# Patient Record
Sex: FEMALE | Race: OTHER | HISPANIC OR LATINO | URBAN - METROPOLITAN AREA
[De-identification: names, ages, dates, MRNs, and addresses within clinical notes are randomized per-mention and may not be internally consistent; named-entity substitution may affect disease eponyms.]

---

## 2023-07-21 ENCOUNTER — OUTPATIENT (OUTPATIENT)
Dept: OUTPATIENT SERVICES | Facility: HOSPITAL | Age: 33
LOS: 1 days | End: 2023-07-21
Payer: MEDICAID

## 2023-07-21 DIAGNOSIS — Z01.818 ENCOUNTER FOR OTHER PREPROCEDURAL EXAMINATION: ICD-10-CM

## 2023-07-21 LAB
ANION GAP SERPL CALC-SCNC: 8 MMOL/L — SIGNIFICANT CHANGE UP (ref 5–17)
APTT BLD: 27.2 SEC — LOW (ref 27.5–35.5)
BLD GP AB SCN SERPL QL: SIGNIFICANT CHANGE UP
BUN SERPL-MCNC: 11 MG/DL — SIGNIFICANT CHANGE UP (ref 7–18)
CALCIUM SERPL-MCNC: 8.8 MG/DL — SIGNIFICANT CHANGE UP (ref 8.4–10.5)
CHLORIDE SERPL-SCNC: 109 MMOL/L — HIGH (ref 96–108)
CO2 SERPL-SCNC: 21 MMOL/L — LOW (ref 22–31)
CREAT SERPL-MCNC: 0.44 MG/DL — LOW (ref 0.5–1.3)
EGFR: 133 ML/MIN/1.73M2 — SIGNIFICANT CHANGE UP
GLUCOSE SERPL-MCNC: 88 MG/DL — SIGNIFICANT CHANGE UP (ref 70–99)
HCT VFR BLD CALC: 31 % — LOW (ref 34.5–45)
HGB BLD-MCNC: 9.5 G/DL — LOW (ref 11.5–15.5)
INR BLD: 0.88 RATIO — SIGNIFICANT CHANGE UP (ref 0.88–1.16)
MCHC RBC-ENTMCNC: 22.4 PG — LOW (ref 27–34)
MCHC RBC-ENTMCNC: 30.6 GM/DL — LOW (ref 32–36)
MCV RBC AUTO: 73.1 FL — LOW (ref 80–100)
NRBC # BLD: 0 /100 WBCS — SIGNIFICANT CHANGE UP (ref 0–0)
PLATELET # BLD AUTO: 294 K/UL — SIGNIFICANT CHANGE UP (ref 150–400)
POTASSIUM SERPL-MCNC: 3.7 MMOL/L — SIGNIFICANT CHANGE UP (ref 3.5–5.3)
POTASSIUM SERPL-SCNC: 3.7 MMOL/L — SIGNIFICANT CHANGE UP (ref 3.5–5.3)
PROTHROM AB SERPL-ACNC: 10.4 SEC — LOW (ref 10.5–13.4)
RBC # BLD: 4.24 M/UL — SIGNIFICANT CHANGE UP (ref 3.8–5.2)
RBC # FLD: 15.5 % — HIGH (ref 10.3–14.5)
SODIUM SERPL-SCNC: 138 MMOL/L — SIGNIFICANT CHANGE UP (ref 135–145)
WBC # BLD: 11.27 K/UL — HIGH (ref 3.8–10.5)
WBC # FLD AUTO: 11.27 K/UL — HIGH (ref 3.8–10.5)

## 2023-07-22 LAB — T PALLIDUM AB TITR SER: NEGATIVE — SIGNIFICANT CHANGE UP

## 2023-07-23 ENCOUNTER — TRANSCRIPTION ENCOUNTER (OUTPATIENT)
Age: 33
End: 2023-07-23

## 2023-07-24 ENCOUNTER — INPATIENT (INPATIENT)
Facility: HOSPITAL | Age: 33
LOS: 2 days | Discharge: HOME | End: 2023-07-27
Attending: OBSTETRICS & GYNECOLOGY | Admitting: OBSTETRICS & GYNECOLOGY
Payer: MEDICAID

## 2023-07-24 ENCOUNTER — TRANSCRIPTION ENCOUNTER (OUTPATIENT)
Age: 33
End: 2023-07-24

## 2023-07-24 VITALS — HEIGHT: 63 IN | WEIGHT: 197.98 LBS

## 2023-07-24 DIAGNOSIS — Z3A.39 39 WEEKS GESTATION OF PREGNANCY: ICD-10-CM

## 2023-07-24 LAB — BLD GP AB SCN SERPL QL: SIGNIFICANT CHANGE UP

## 2023-07-24 PROCEDURE — G0463: CPT

## 2023-07-24 DEVICE — SURGICEL FIBRILLAR 1 X 2": Type: IMPLANTABLE DEVICE | Status: FUNCTIONAL

## 2023-07-24 DEVICE — INTERCEED 5 X 6" XL: Type: IMPLANTABLE DEVICE | Status: FUNCTIONAL

## 2023-07-24 DEVICE — SURGICEL FIBRILLAR 2 X 4": Type: IMPLANTABLE DEVICE | Status: FUNCTIONAL

## 2023-07-24 RX ORDER — FAMOTIDINE 10 MG/ML
20 INJECTION INTRAVENOUS ONCE
Refills: 0 | Status: COMPLETED | OUTPATIENT
Start: 2023-07-24 | End: 2023-07-24

## 2023-07-24 RX ORDER — KETOROLAC TROMETHAMINE 30 MG/ML
30 SYRINGE (ML) INJECTION EVERY 6 HOURS
Refills: 0 | Status: DISCONTINUED | OUTPATIENT
Start: 2023-07-24 | End: 2023-07-25

## 2023-07-24 RX ORDER — CEFAZOLIN SODIUM 1 G
2000 VIAL (EA) INJECTION ONCE
Refills: 0 | Status: COMPLETED | OUTPATIENT
Start: 2023-07-24 | End: 2023-07-24

## 2023-07-24 RX ORDER — CITRIC ACID/SODIUM CITRATE 300-500 MG
30 SOLUTION, ORAL ORAL ONCE
Refills: 0 | Status: COMPLETED | OUTPATIENT
Start: 2023-07-24 | End: 2023-07-24

## 2023-07-24 RX ORDER — SODIUM CHLORIDE 9 MG/ML
1000 INJECTION, SOLUTION INTRAVENOUS
Refills: 0 | Status: DISCONTINUED | OUTPATIENT
Start: 2023-07-24 | End: 2023-07-24

## 2023-07-24 RX ORDER — ACETAMINOPHEN 500 MG
975 TABLET ORAL EVERY 6 HOURS
Refills: 0 | Status: DISCONTINUED | OUTPATIENT
Start: 2023-07-25 | End: 2023-07-27

## 2023-07-24 RX ORDER — HEPARIN SODIUM 5000 [USP'U]/ML
5000 INJECTION INTRAVENOUS; SUBCUTANEOUS EVERY 12 HOURS
Refills: 0 | Status: DISCONTINUED | OUTPATIENT
Start: 2023-07-25 | End: 2023-07-27

## 2023-07-24 RX ORDER — SODIUM CHLORIDE 9 MG/ML
1000 INJECTION, SOLUTION INTRAVENOUS
Refills: 0 | Status: DISCONTINUED | OUTPATIENT
Start: 2023-07-24 | End: 2023-07-27

## 2023-07-24 RX ORDER — LANOLIN
1 OINTMENT (GRAM) TOPICAL EVERY 6 HOURS
Refills: 0 | Status: DISCONTINUED | OUTPATIENT
Start: 2023-07-24 | End: 2023-07-27

## 2023-07-24 RX ORDER — ONDANSETRON 8 MG/1
4 TABLET, FILM COATED ORAL EVERY 6 HOURS
Refills: 0 | Status: DISCONTINUED | OUTPATIENT
Start: 2023-07-24 | End: 2023-07-27

## 2023-07-24 RX ORDER — OXYTOCIN 10 UNIT/ML
333.33 VIAL (ML) INJECTION
Qty: 20 | Refills: 0 | Status: DISCONTINUED | OUTPATIENT
Start: 2023-07-24 | End: 2023-07-27

## 2023-07-24 RX ORDER — NALOXONE HYDROCHLORIDE 4 MG/.1ML
0.1 SPRAY NASAL
Refills: 0 | Status: DISCONTINUED | OUTPATIENT
Start: 2023-07-24 | End: 2023-07-27

## 2023-07-24 RX ORDER — IBUPROFEN 200 MG
600 TABLET ORAL EVERY 6 HOURS
Refills: 0 | Status: DISCONTINUED | OUTPATIENT
Start: 2023-07-25 | End: 2023-07-27

## 2023-07-24 RX ORDER — DIPHENHYDRAMINE HCL 50 MG
25 CAPSULE ORAL EVERY 6 HOURS
Refills: 0 | Status: DISCONTINUED | OUTPATIENT
Start: 2023-07-24 | End: 2023-07-27

## 2023-07-24 RX ORDER — OXYCODONE HYDROCHLORIDE 5 MG/1
5 TABLET ORAL
Refills: 0 | Status: DISCONTINUED | OUTPATIENT
Start: 2023-07-25 | End: 2023-07-27

## 2023-07-24 RX ORDER — SIMETHICONE 80 MG/1
80 TABLET, CHEWABLE ORAL EVERY 4 HOURS
Refills: 0 | Status: DISCONTINUED | OUTPATIENT
Start: 2023-07-24 | End: 2023-07-27

## 2023-07-24 RX ORDER — AZITHROMYCIN 500 MG/1
500 TABLET, FILM COATED ORAL ONCE
Refills: 0 | Status: COMPLETED | OUTPATIENT
Start: 2023-07-24 | End: 2023-07-24

## 2023-07-24 RX ORDER — SODIUM CHLORIDE 9 MG/ML
1000 INJECTION, SOLUTION INTRAVENOUS ONCE
Refills: 0 | Status: COMPLETED | OUTPATIENT
Start: 2023-07-24 | End: 2023-07-24

## 2023-07-24 RX ORDER — DEXAMETHASONE 0.5 MG/5ML
4 ELIXIR ORAL EVERY 6 HOURS
Refills: 0 | Status: DISCONTINUED | OUTPATIENT
Start: 2023-07-24 | End: 2023-07-27

## 2023-07-24 RX ORDER — ACETAMINOPHEN 500 MG
1000 TABLET ORAL ONCE
Refills: 0 | Status: COMPLETED | OUTPATIENT
Start: 2023-07-24 | End: 2023-07-24

## 2023-07-24 RX ORDER — MAGNESIUM HYDROXIDE 400 MG/1
30 TABLET, CHEWABLE ORAL
Refills: 0 | Status: DISCONTINUED | OUTPATIENT
Start: 2023-07-24 | End: 2023-07-27

## 2023-07-24 RX ORDER — TETANUS TOXOID, REDUCED DIPHTHERIA TOXOID AND ACELLULAR PERTUSSIS VACCINE, ADSORBED 5; 2.5; 8; 8; 2.5 [IU]/.5ML; [IU]/.5ML; UG/.5ML; UG/.5ML; UG/.5ML
0.5 SUSPENSION INTRAMUSCULAR ONCE
Refills: 0 | Status: DISCONTINUED | OUTPATIENT
Start: 2023-07-24 | End: 2023-07-27

## 2023-07-24 RX ORDER — FOLIC ACID 0.8 MG
1 TABLET ORAL DAILY
Refills: 0 | Status: DISCONTINUED | OUTPATIENT
Start: 2023-07-25 | End: 2023-07-27

## 2023-07-24 RX ORDER — FERROUS SULFATE 325(65) MG
325 TABLET ORAL DAILY
Refills: 0 | Status: DISCONTINUED | OUTPATIENT
Start: 2023-07-25 | End: 2023-07-27

## 2023-07-24 RX ORDER — MORPHINE SULFATE 50 MG/1
0.2 CAPSULE, EXTENDED RELEASE ORAL ONCE
Refills: 0 | Status: DISCONTINUED | OUTPATIENT
Start: 2023-07-24 | End: 2023-07-27

## 2023-07-24 RX ADMIN — Medication 100 MILLIGRAM(S): at 11:02

## 2023-07-24 RX ADMIN — AZITHROMYCIN 255 MILLIGRAM(S): 500 TABLET, FILM COATED ORAL at 11:25

## 2023-07-24 RX ADMIN — FAMOTIDINE 20 MILLIGRAM(S): 10 INJECTION INTRAVENOUS at 10:48

## 2023-07-24 RX ADMIN — Medication 1000 MILLIUNIT(S)/MIN: at 12:01

## 2023-07-24 RX ADMIN — Medication 30 MILLILITER(S): at 10:44

## 2023-07-24 RX ADMIN — SODIUM CHLORIDE 2000 MILLILITER(S): 9 INJECTION, SOLUTION INTRAVENOUS at 10:00

## 2023-07-24 RX ADMIN — Medication 30 MILLIGRAM(S): at 18:19

## 2023-07-24 RX ADMIN — Medication 400 MILLIGRAM(S): at 14:54

## 2023-07-24 RX ADMIN — Medication 1000 MILLIGRAM(S): at 15:15

## 2023-07-24 RX ADMIN — SIMETHICONE 80 MILLIGRAM(S): 80 TABLET, CHEWABLE ORAL at 18:19

## 2023-07-24 NOTE — DISCHARGE NOTE OB - MEDICATION SUMMARY - MEDICATIONS TO TAKE
I will START or STAY ON the medications listed below when I get home from the hospital:    ibuprofen 600 mg oral tablet  -- 1 tab(s) by mouth every 6 hours as needed for  moderate pain  -- Indication: For moderate pain    acetaminophen 325 mg oral tablet  -- 3 tab(s) by mouth every 6 hours as needed for Mild Pain (1 - 3)  -- Indication: For mild pain or fever    Prenatal 1  -- 1 tab(s) by mouth once a day  -- Indication: For Suppleemnt    FeroSul 325 mg (65 mg elemental iron) oral tablet  -- 1 tab(s) by mouth 2 times a day  -- Indication: For Anemia due to acute blood loss    Colace 100 mg oral capsule  -- 1 cap(s) by mouth 2 times a day as needed for  constipation  -- Indication: For Constipation    simethicone 80 mg oral tablet, chewable  -- 1 tab(s) chewed 2 times a day as needed for gas or bloating  -- Indication: For gas or bloating

## 2023-07-24 NOTE — DISCHARGE NOTE OB - PLAN OF CARE
1. Return to your regular way of eating. Resume normal activity as tolerated, however No heavy lifting or strenuous activity for 6 weeks.  No driving for next 2 weeks and/or while on narcotic pain medication.  Complete vaginal rest, no tampons, no douching, no tub bathing, no emerging in swimming pools, no sexual activities for 6 weeks unless otherwise instructed by your doctor. Call your doctor with any signs and symptoms of infection such as fever, chills, nausea or vomiting.  Call your doctor with redness or swelling at the incision site, fluid leakage or wound separation.  Call your doctor if you're unable to tolerate food or have difficulty urinating.  Call your doctor if you have pain that is not relieved by your prescribed medications.  Notify your doctor with any other concerns.  2. Please take Ibuprofen for moderate pain management. 600mg every 6 hours, as needed.  3. Please take Tylenol for mild pain management 500mg every 6 hours, as needed may take 2 tablets , max daily 4000mg only.   4. Ambulate daily. consume iron rich foods.   5. Follow-up in the office in 2 weeks for wound check. take iron, folic acid, vitamin C, and prenatal vitamins. eat iron fortified food

## 2023-07-24 NOTE — DISCHARGE NOTE OB - CARE PROVIDER_API CALL
Nora Bullard  Obstetrics and Gynecology  [address] [facility], [address]  [place] [address]  Phone: [phone]  Fax: [phone]  Established Patient  Follow Up Time: 2 weeks

## 2023-07-24 NOTE — DISCHARGE NOTE OB - HOSPITAL COURSE
Pt had a scheduled repeat  section at 39.3wks. Pt had routine post-op care and was discharged to home.

## 2023-07-24 NOTE — DISCHARGE NOTE OB - PATIENT PORTAL LINK FT
You can access the FollowMyHealth Patient Portal offered by [facility] by registering at the following website: [email or web address]. By joining [employer]’s FollowMyHealth portal, you will also be able to view your health information using other applications (apps) compatible with our system.

## 2023-07-24 NOTE — DISCHARGE NOTE OB - CARE PLAN
1 Principal Discharge DX:	Status post  section  Assessment and plan of treatment:	1. Return to your regular way of eating. Resume normal activity as tolerated, however No heavy lifting or strenuous activity for 6 weeks.  No driving for next 2 weeks and/or while on narcotic pain medication.  Complete vaginal rest, no tampons, no douching, no tub bathing, no emerging in swimming pools, no sexual activities for 6 weeks unless otherwise instructed by your doctor. Call your doctor with any signs and symptoms of infection such as fever, chills, nausea or vomiting.  Call your doctor with redness or swelling at the incision site, fluid leakage or wound separation.  Call your doctor if you're unable to tolerate food or have difficulty urinating.  Call your doctor if you have pain that is not relieved by your prescribed medications.  Notify your doctor with any other concerns.  2. Please take Ibuprofen for moderate pain management. 600mg every 6 hours, as needed.  3. Please take Tylenol for mild pain management 500mg every 6 hours, as needed may take 2 tablets , max daily 4000mg only.   4. Ambulate daily. consume iron rich foods.   5. Follow-up in the office in 2 weeks for wound check.   Principal Discharge DX:	Status post  section  Assessment and plan of treatment:	1. Return to your regular way of eating. Resume normal activity as tolerated, however No heavy lifting or strenuous activity for 6 weeks.  No driving for next 2 weeks and/or while on narcotic pain medication.  Complete vaginal rest, no tampons, no douching, no tub bathing, no emerging in swimming pools, no sexual activities for 6 weeks unless otherwise instructed by your doctor. Call your doctor with any signs and symptoms of infection such as fever, chills, nausea or vomiting.  Call your doctor with redness or swelling at the incision site, fluid leakage or wound separation.  Call your doctor if you're unable to tolerate food or have difficulty urinating.  Call your doctor if you have pain that is not relieved by your prescribed medications.  Notify your doctor with any other concerns.  2. Please take Ibuprofen for moderate pain management. 600mg every 6 hours, as needed.  3. Please take Tylenol for mild pain management 500mg every 6 hours, as needed may take 2 tablets , max daily 4000mg only.   4. Ambulate daily. consume iron rich foods.   5. Follow-up in the office in 2 weeks for wound check.  Secondary Diagnosis:	Anemia due to acute blood loss  Assessment and plan of treatment:	take iron, folic acid, vitamin C, and prenatal vitamins. eat iron fortified food

## 2023-07-24 NOTE — DISCHARGE NOTE OB - NS MD DC FALL RISK RISK
no For information on Fall & Injury Prevention, visit: [email or web address] OR  [email or web address] OR  https://www.cdc.gov/steadi/patient.html

## 2023-07-24 NOTE — PATIENT PROFILE OB - BABY A: COMPLICATIONS, DELIVERY
- Get blood and urine tests done when you are fasting (water and medications only for 8 hours). You can schedule a lab appointment through [address] [place] [address] 951 or [address] [place] [address]. [facility] or walk in to the lab  - Schedule ultrasounds of legs to rule out blood clots. Schedule through [employer] or call central scheduling at [phone]  - Follow up with Elba Jose or Dr. Robert Angeles here in our office for physical/pap smear after you get blood tests done. - Follow up with our hematologists to discuss if you need further blood tests to look into your miscarriages:  Dr. Pb Kitchen, Ernie Guillen  [address], [address]  [address]  [facility]  [phone]    It was a pleasure seeing you in the clinic today. Thank you for choosing the [facility] office for your healthcare needs. Please call at [phone] with any questions or concerns.     Juhi Lubin MD
none

## 2023-07-24 NOTE — CHART NOTE - NSCHARTNOTEFT_GEN_A_CORE
Patient seen at bedside, resting comfortably offers no new complaints. Fernando in place, clear. Due to ambulate, fernando to be removed and due to void, tolerating clear diet at this time.  Attempting breastfeeding.  Denies HA, CP, SOB, N/V/D, dizziness, palpitations, worsening abdominal pain, worsening vaginal bleeding, or any other concerns.      Vital Signs Last 24 Hrs  T(C): 36.4 (24 Jul 2023 13:23), Max: 36.7 (24 Jul 2023 09:30)  T(F): 97.5 (24 Jul 2023 13:23), Max: 98 (24 Jul 2023 09:30)  HR: 64 (24 Jul 2023 15:00) (64 - 88)  BP: 105/70 (24 Jul 2023 15:00) (103/59 - 115/83)  BP(mean): 82 (24 Jul 2023 15:00) (73 - 91)  RR: 12 (24 Jul 2023 15:00) (11 - 20)  SpO2: 100% (24 Jul 2023 15:00) (97% - 100%)    Gen: A&O x 3, NAD  Chest: CTA B/L  Cardiac: S1, S2  RRR  Breast: Soft, nontender, nonengorged  Abdomen: +BS; soft; NT; ND; Dressing C/D/I  Gyn: Minimal lochia  Ext: Nontender, DTRS 2+, no worsening edema, venodynes intact      A/P: POD #0 s/p c/s       -Pain management as needed  -Advance as per protocol  -OOB and ambulate  -f/u Rpt CBC   -DC fernando f/u void  -Advance diet with flatus  -Encourage breastfeeding   -VTE prophylaxis  -d/w Dr Hamm

## 2023-07-25 DIAGNOSIS — D62 ACUTE POSTHEMORRHAGIC ANEMIA: ICD-10-CM

## 2023-07-25 LAB
ANISOCYTOSIS BLD QL: SLIGHT — SIGNIFICANT CHANGE UP
BASOPHILS # BLD AUTO: 0.01 K/UL — SIGNIFICANT CHANGE UP (ref 0–0.2)
BASOPHILS NFR BLD AUTO: 0.1 % — SIGNIFICANT CHANGE UP (ref 0–2)
EOSINOPHIL # BLD AUTO: 0.04 K/UL — SIGNIFICANT CHANGE UP (ref 0–0.5)
EOSINOPHIL NFR BLD AUTO: 0.3 % — SIGNIFICANT CHANGE UP (ref 0–6)
HCT VFR BLD CALC: 27 % — LOW (ref 34.5–45)
HGB BLD-MCNC: 8.1 G/DL — LOW (ref 11.5–15.5)
HYPOCHROMIA BLD QL: SLIGHT — SIGNIFICANT CHANGE UP
IMM GRANULOCYTES NFR BLD AUTO: 0.7 % — SIGNIFICANT CHANGE UP (ref 0–0.9)
LG PLATELETS BLD QL AUTO: SLIGHT — SIGNIFICANT CHANGE UP
LYMPHOCYTES # BLD AUTO: 1.23 K/UL — SIGNIFICANT CHANGE UP (ref 1–3.3)
LYMPHOCYTES # BLD AUTO: 10.6 % — LOW (ref 13–44)
MANUAL SMEAR VERIFICATION: SIGNIFICANT CHANGE UP
MCHC RBC-ENTMCNC: 22.1 PG — LOW (ref 27–34)
MCHC RBC-ENTMCNC: 30 GM/DL — LOW (ref 32–36)
MCV RBC AUTO: 73.8 FL — LOW (ref 80–100)
MONOCYTES # BLD AUTO: 0.68 K/UL — SIGNIFICANT CHANGE UP (ref 0–0.9)
MONOCYTES NFR BLD AUTO: 5.9 % — SIGNIFICANT CHANGE UP (ref 2–14)
NEUTROPHILS # BLD AUTO: 9.53 K/UL — HIGH (ref 1.8–7.4)
NEUTROPHILS NFR BLD AUTO: 82.4 % — HIGH (ref 43–77)
NRBC # BLD: 0 /100 WBCS — SIGNIFICANT CHANGE UP (ref 0–0)
OVALOCYTES BLD QL SMEAR: SLIGHT — SIGNIFICANT CHANGE UP
PLAT MORPH BLD: NORMAL — SIGNIFICANT CHANGE UP
PLATELET # BLD AUTO: 240 K/UL — SIGNIFICANT CHANGE UP (ref 150–400)
PLATELET COUNT - ESTIMATE: NORMAL — SIGNIFICANT CHANGE UP
POIKILOCYTOSIS BLD QL AUTO: SLIGHT — SIGNIFICANT CHANGE UP
RBC # BLD: 3.66 M/UL — LOW (ref 3.8–5.2)
RBC # FLD: 15.6 % — HIGH (ref 10.3–14.5)
RBC BLD AUTO: ABNORMAL
WBC # BLD: 11.57 K/UL — HIGH (ref 3.8–10.5)
WBC # FLD AUTO: 11.57 K/UL — HIGH (ref 3.8–10.5)

## 2023-07-25 RX ORDER — ACETAMINOPHEN 500 MG
3 TABLET ORAL
Qty: 0 | Refills: 0 | DISCHARGE
Start: 2023-07-25

## 2023-07-25 RX ORDER — IBUPROFEN 200 MG
1 TABLET ORAL
Qty: 28 | Refills: 0
Start: 2023-07-25 | End: 2023-07-31

## 2023-07-25 RX ORDER — DOCUSATE SODIUM 100 MG
1 CAPSULE ORAL
Qty: 20 | Refills: 0
Start: 2023-07-25 | End: 2023-08-03

## 2023-07-25 RX ORDER — FERROUS SULFATE 325(65) MG
1 TABLET ORAL
Qty: 60 | Refills: 0
Start: 2023-07-25 | End: 2023-08-23

## 2023-07-25 RX ORDER — SENNA PLUS 8.6 MG/1
2 TABLET ORAL AT BEDTIME
Refills: 0 | Status: DISCONTINUED | OUTPATIENT
Start: 2023-07-25 | End: 2023-07-27

## 2023-07-25 RX ORDER — SIMETHICONE 80 MG/1
1 TABLET, CHEWABLE ORAL
Qty: 10 | Refills: 0
Start: 2023-07-25 | End: 2023-07-29

## 2023-07-25 RX ORDER — HEPARIN SODIUM 5000 [USP'U]/ML
5000 INJECTION INTRAVENOUS; SUBCUTANEOUS ONCE
Refills: 0 | Status: COMPLETED | OUTPATIENT
Start: 2023-07-25 | End: 2023-07-25

## 2023-07-25 RX ADMIN — Medication 600 MILLIGRAM(S): at 19:00

## 2023-07-25 RX ADMIN — Medication 600 MILLIGRAM(S): at 11:53

## 2023-07-25 RX ADMIN — OXYCODONE HYDROCHLORIDE 5 MILLIGRAM(S): 5 TABLET ORAL at 15:35

## 2023-07-25 RX ADMIN — Medication 30 MILLIGRAM(S): at 07:07

## 2023-07-25 RX ADMIN — Medication 975 MILLIGRAM(S): at 13:45

## 2023-07-25 RX ADMIN — Medication 1 TABLET(S): at 11:52

## 2023-07-25 RX ADMIN — Medication 1 MILLIGRAM(S): at 11:52

## 2023-07-25 RX ADMIN — Medication 975 MILLIGRAM(S): at 20:12

## 2023-07-25 RX ADMIN — HEPARIN SODIUM 5000 UNIT(S): 5000 INJECTION INTRAVENOUS; SUBCUTANEOUS at 16:07

## 2023-07-25 RX ADMIN — Medication 325 MILLIGRAM(S): at 11:52

## 2023-07-25 RX ADMIN — Medication 975 MILLIGRAM(S): at 14:45

## 2023-07-25 RX ADMIN — Medication 30 MILLIGRAM(S): at 08:24

## 2023-07-25 RX ADMIN — OXYCODONE HYDROCHLORIDE 5 MILLIGRAM(S): 5 TABLET ORAL at 16:45

## 2023-07-25 RX ADMIN — Medication 600 MILLIGRAM(S): at 12:50

## 2023-07-25 RX ADMIN — HEPARIN SODIUM 5000 UNIT(S): 5000 INJECTION INTRAVENOUS; SUBCUTANEOUS at 01:46

## 2023-07-25 RX ADMIN — Medication 600 MILLIGRAM(S): at 18:00

## 2023-07-25 NOTE — LACTATION INITIAL EVALUATION - LACTATION INTERVENTIONS
Breastfeeding on cue 8-12X/24 hours with diaper count to assess for adequate intake, safe skin to skin and rooming-in encouraged./initiate/review safe skin-to-skin/initiate/review hand expression/review techniques to increase milk supply/initiate/review breast massage/compression/reviewed risks of artificial nipples/reviewed benefits and recommendations for rooming in/reviewed feeding on demand/by cue at least 8 times a day

## 2023-07-25 NOTE — PROGRESS NOTE ADULT - ASSESSMENT
A/P: POD #1 s/p scheduled rpt c/s 39 weeks 3 days, stable    -Pain management, postop care  -heparin dvt prophylaxis  -OOB and ambulate, incentive spirometry  - f/u void  -Advance diet  -Encourage breastfeeding   -d/w Dr. Bullard

## 2023-07-25 NOTE — LACTATION INITIAL EVALUATION - BABY A: GESTATIONAL AGE (WK), DELIVERY
39.3
Alert-The patient is alert, awake and responds to voice. The patient is oriented to time, place, and person. The triage nurse is able to obtain subjective information.

## 2023-07-26 LAB
BASOPHILS # BLD AUTO: 0.02 K/UL — SIGNIFICANT CHANGE UP (ref 0–0.2)
BASOPHILS NFR BLD AUTO: 0.2 % — SIGNIFICANT CHANGE UP (ref 0–2)
EOSINOPHIL # BLD AUTO: 0.14 K/UL — SIGNIFICANT CHANGE UP (ref 0–0.5)
EOSINOPHIL NFR BLD AUTO: 1.3 % — SIGNIFICANT CHANGE UP (ref 0–6)
HCT VFR BLD CALC: 24.8 % — LOW (ref 34.5–45)
HGB BLD-MCNC: 7.5 G/DL — LOW (ref 11.5–15.5)
IMM GRANULOCYTES NFR BLD AUTO: 1.1 % — HIGH (ref 0–0.9)
LYMPHOCYTES # BLD AUTO: 1.98 K/UL — SIGNIFICANT CHANGE UP (ref 1–3.3)
LYMPHOCYTES # BLD AUTO: 19 % — SIGNIFICANT CHANGE UP (ref 13–44)
MCHC RBC-ENTMCNC: 22.8 PG — LOW (ref 27–34)
MCHC RBC-ENTMCNC: 30.2 GM/DL — LOW (ref 32–36)
MCV RBC AUTO: 75.4 FL — LOW (ref 80–100)
MONOCYTES # BLD AUTO: 0.77 K/UL — SIGNIFICANT CHANGE UP (ref 0–0.9)
MONOCYTES NFR BLD AUTO: 7.4 % — SIGNIFICANT CHANGE UP (ref 2–14)
NEUTROPHILS # BLD AUTO: 7.41 K/UL — HIGH (ref 1.8–7.4)
NEUTROPHILS NFR BLD AUTO: 71 % — SIGNIFICANT CHANGE UP (ref 43–77)
NRBC # BLD: 0 /100 WBCS — SIGNIFICANT CHANGE UP (ref 0–0)
PLATELET # BLD AUTO: 257 K/UL — SIGNIFICANT CHANGE UP (ref 150–400)
RBC # BLD: 3.29 M/UL — LOW (ref 3.8–5.2)
RBC # FLD: 15.9 % — HIGH (ref 10.3–14.5)
WBC # BLD: 10.43 K/UL — SIGNIFICANT CHANGE UP (ref 3.8–10.5)
WBC # FLD AUTO: 10.43 K/UL — SIGNIFICANT CHANGE UP (ref 3.8–10.5)

## 2023-07-26 RX ORDER — IRON SUCROSE 20 MG/ML
200 INJECTION, SOLUTION INTRAVENOUS ONCE
Refills: 0 | Status: COMPLETED | OUTPATIENT
Start: 2023-07-26 | End: 2023-07-26

## 2023-07-26 RX ORDER — KETOROLAC TROMETHAMINE 30 MG/ML
30 SYRINGE (ML) INJECTION ONCE
Refills: 0 | Status: DISCONTINUED | OUTPATIENT
Start: 2023-07-26 | End: 2023-07-26

## 2023-07-26 RX ADMIN — IRON SUCROSE 110 MILLIGRAM(S): 20 INJECTION, SOLUTION INTRAVENOUS at 10:36

## 2023-07-26 RX ADMIN — Medication 600 MILLIGRAM(S): at 19:37

## 2023-07-26 RX ADMIN — OXYCODONE HYDROCHLORIDE 5 MILLIGRAM(S): 5 TABLET ORAL at 20:34

## 2023-07-26 RX ADMIN — Medication 975 MILLIGRAM(S): at 14:20

## 2023-07-26 RX ADMIN — OXYCODONE HYDROCHLORIDE 5 MILLIGRAM(S): 5 TABLET ORAL at 10:20

## 2023-07-26 RX ADMIN — Medication 975 MILLIGRAM(S): at 02:22

## 2023-07-26 RX ADMIN — HEPARIN SODIUM 5000 UNIT(S): 5000 INJECTION INTRAVENOUS; SUBCUTANEOUS at 16:29

## 2023-07-26 RX ADMIN — Medication 30 MILLIGRAM(S): at 15:24

## 2023-07-26 RX ADMIN — Medication 975 MILLIGRAM(S): at 13:29

## 2023-07-26 RX ADMIN — Medication 30 MILLIGRAM(S): at 15:55

## 2023-07-26 RX ADMIN — Medication 600 MILLIGRAM(S): at 08:17

## 2023-07-26 RX ADMIN — OXYCODONE HYDROCHLORIDE 5 MILLIGRAM(S): 5 TABLET ORAL at 21:34

## 2023-07-26 RX ADMIN — Medication 975 MILLIGRAM(S): at 20:49

## 2023-07-26 RX ADMIN — SENNA PLUS 2 TABLET(S): 8.6 TABLET ORAL at 00:25

## 2023-07-26 RX ADMIN — Medication 1 TABLET(S): at 11:20

## 2023-07-26 RX ADMIN — Medication 600 MILLIGRAM(S): at 20:49

## 2023-07-26 RX ADMIN — Medication 325 MILLIGRAM(S): at 11:20

## 2023-07-26 RX ADMIN — OXYCODONE HYDROCHLORIDE 5 MILLIGRAM(S): 5 TABLET ORAL at 09:21

## 2023-07-26 RX ADMIN — Medication 600 MILLIGRAM(S): at 00:26

## 2023-07-26 RX ADMIN — Medication 600 MILLIGRAM(S): at 09:04

## 2023-07-26 RX ADMIN — Medication 600 MILLIGRAM(S): at 01:20

## 2023-07-26 RX ADMIN — MAGNESIUM HYDROXIDE 30 MILLILITER(S): 400 TABLET, CHEWABLE ORAL at 11:22

## 2023-07-26 RX ADMIN — Medication 975 MILLIGRAM(S): at 19:49

## 2023-07-26 RX ADMIN — Medication 1 MILLIGRAM(S): at 11:20

## 2023-07-26 RX ADMIN — HEPARIN SODIUM 5000 UNIT(S): 5000 INJECTION INTRAVENOUS; SUBCUTANEOUS at 03:45

## 2023-07-26 NOTE — PROGRESS NOTE ADULT - ASSESSMENT
A/P:  33y POD # 2 s/p repeat  @39w3d acute blood loss anemia, currently in stable condition  -- iron supplment  -- Heparin for dvt prophylaxis  -- OOB  -- incentive spirometry  -- continue pain mgmt  -- continue post op care  -- for possible discharge home today   --d/w Dr Borrego, house attending A/P:  33y POD # 2 s/p repeat  @39w3d acute on chronic anemia, currently in stable condition  -- iron supplment  -- Heparin for dvt prophylaxis  -- OOB  -- incentive spirometry  -- continue pain mgmt  -- continue post op care  -- for possible discharge home today   --d/w Dr Borrego, house attending

## 2023-07-27 VITALS
OXYGEN SATURATION: 98 % | TEMPERATURE: 98 F | HEART RATE: 84 BPM | RESPIRATION RATE: 18 BRPM | DIASTOLIC BLOOD PRESSURE: 73 MMHG | SYSTOLIC BLOOD PRESSURE: 108 MMHG

## 2023-07-27 DIAGNOSIS — M54.9 DORSALGIA, UNSPECIFIED: ICD-10-CM

## 2023-07-27 LAB
BASOPHILS # BLD AUTO: 0 K/UL — SIGNIFICANT CHANGE UP (ref 0–0.2)
BASOPHILS NFR BLD AUTO: 0 % — SIGNIFICANT CHANGE UP (ref 0–2)
EOSINOPHIL # BLD AUTO: 0.08 K/UL — SIGNIFICANT CHANGE UP (ref 0–0.5)
EOSINOPHIL NFR BLD AUTO: 1 % — SIGNIFICANT CHANGE UP (ref 0–6)
HCT VFR BLD CALC: 25.2 % — LOW (ref 34.5–45)
HGB BLD-MCNC: 7.5 G/DL — LOW (ref 11.5–15.5)
LYMPHOCYTES # BLD AUTO: 1.71 K/UL — SIGNIFICANT CHANGE UP (ref 1–3.3)
LYMPHOCYTES # BLD AUTO: 22 % — SIGNIFICANT CHANGE UP (ref 13–44)
MCHC RBC-ENTMCNC: 22.5 PG — LOW (ref 27–34)
MCHC RBC-ENTMCNC: 29.8 GM/DL — LOW (ref 32–36)
MCV RBC AUTO: 75.4 FL — LOW (ref 80–100)
MONOCYTES # BLD AUTO: 0.31 K/UL — SIGNIFICANT CHANGE UP (ref 0–0.9)
MONOCYTES NFR BLD AUTO: 4 % — SIGNIFICANT CHANGE UP (ref 2–14)
NEUTROPHILS # BLD AUTO: 5.51 K/UL — SIGNIFICANT CHANGE UP (ref 1.8–7.4)
NEUTROPHILS NFR BLD AUTO: 71 % — SIGNIFICANT CHANGE UP (ref 43–77)
PLATELET # BLD AUTO: 269 K/UL — SIGNIFICANT CHANGE UP (ref 150–400)
RBC # BLD: 3.34 M/UL — LOW (ref 3.8–5.2)
RBC # FLD: 16.1 % — HIGH (ref 10.3–14.5)
WBC # BLD: 7.76 K/UL — SIGNIFICANT CHANGE UP (ref 3.8–10.5)
WBC # FLD AUTO: 7.76 K/UL — SIGNIFICANT CHANGE UP (ref 3.8–10.5)

## 2023-07-27 PROCEDURE — 86850 RBC ANTIBODY SCREEN: CPT

## 2023-07-27 PROCEDURE — C1889: CPT

## 2023-07-27 PROCEDURE — 86901 BLOOD TYPING SEROLOGIC RH(D): CPT

## 2023-07-27 PROCEDURE — 59025 FETAL NON-STRESS TEST: CPT

## 2023-07-27 PROCEDURE — 85025 COMPLETE CBC W/AUTO DIFF WBC: CPT

## 2023-07-27 PROCEDURE — C1765: CPT

## 2023-07-27 PROCEDURE — 86923 COMPATIBILITY TEST ELECTRIC: CPT

## 2023-07-27 PROCEDURE — 36415 COLL VENOUS BLD VENIPUNCTURE: CPT

## 2023-07-27 PROCEDURE — 99222 1ST HOSP IP/OBS MODERATE 55: CPT

## 2023-07-27 PROCEDURE — 86900 BLOOD TYPING SEROLOGIC ABO: CPT

## 2023-07-27 PROCEDURE — 59050 FETAL MONITOR W/REPORT: CPT

## 2023-07-27 RX ORDER — OXYCODONE HYDROCHLORIDE 5 MG/1
1 TABLET ORAL
Qty: 16 | Refills: 0
Start: 2023-07-27 | End: 2023-07-30

## 2023-07-27 RX ORDER — LIDOCAINE 4 G/100G
1 CREAM TOPICAL DAILY
Refills: 0 | Status: DISCONTINUED | OUTPATIENT
Start: 2023-07-27 | End: 2023-07-27

## 2023-07-27 RX ORDER — ACETAMINOPHEN 500 MG
1000 TABLET ORAL EVERY 6 HOURS
Refills: 0 | Status: DISCONTINUED | OUTPATIENT
Start: 2023-07-27 | End: 2023-07-27

## 2023-07-27 RX ADMIN — Medication 600 MILLIGRAM(S): at 02:52

## 2023-07-27 RX ADMIN — HEPARIN SODIUM 5000 UNIT(S): 5000 INJECTION INTRAVENOUS; SUBCUTANEOUS at 05:22

## 2023-07-27 RX ADMIN — Medication 975 MILLIGRAM(S): at 02:52

## 2023-07-27 RX ADMIN — OXYCODONE HYDROCHLORIDE 5 MILLIGRAM(S): 5 TABLET ORAL at 07:15

## 2023-07-27 RX ADMIN — Medication 600 MILLIGRAM(S): at 01:52

## 2023-07-27 RX ADMIN — Medication 600 MILLIGRAM(S): at 09:51

## 2023-07-27 RX ADMIN — OXYCODONE HYDROCHLORIDE 5 MILLIGRAM(S): 5 TABLET ORAL at 06:15

## 2023-07-27 RX ADMIN — Medication 1000 MILLIGRAM(S): at 10:22

## 2023-07-27 RX ADMIN — Medication 975 MILLIGRAM(S): at 01:52

## 2023-07-27 NOTE — CONSULT NOTE ADULT - ASSESSMENT
Search Terms: Carole Apodaca, 1990Search Date: 07/27/2023 10:40:44 AM  The Drug Utilization Report below displays all of the controlled substance prescriptions, if any, that your patient has filled in the last twelve months. The information displayed on this report is compiled from pharmacy submissions to the Department, and accurately reflects the information as submitted by the pharmacies.    This report was requested by: Salma Pearl | Reference #: [id number]    Practitioner Count: 0  Pharmacy Count: 0  Current Opioid Prescriptions: 0  Current Benzodiazepine Prescriptions: 0  Current Stimulant Prescriptions: 0      Patient Demographic Information (PDI)       PDI	First Name	Last Name	Birth Date	Gender	Street Address	City	State	Zip Code  A	Carole Apodaca	1990	Female	[id number]	[place] [address]    Prescription Information      PDI Filter:    PDI	My Rx	Current Rx	Drug Type	Rx Written	Rx Dispensed	Drug	Quantity	Days Supply	Prescriber Name	Prescriber SEVERINO #	Payment Method	Dispenser  A	N	N	S	02/03/2023	02/04/2023	dextroamp-amphetamin 30 mg tab	30	30	Goldberg, Eric	[id number]	Medicaid	[id number]  A	N	N	S	01/04/2023	01/04/2023	dextroamp-amphetamin 30 mg tab	30	30	GoldbergBulmaro	[id number]	Medicaid	[id number]  A	N	N	S	12/06/2022	12/07/2022	dextroamp-amphetamin 30 mg tab	30	30	GoldbergBulmaro	[id number]	Medicaid	[id number]  A	N	N	S	11/09/2022	11/09/2022	dextroamp-amphetamin 30 mg tab	30	30	GoldbergBulmaro	[id number]	Medicaid	[id number]  A	N	N	S	10/04/2022	10/04/2022	dextroamp-amphetamin 30 mg tab	30	30	Goldberg, Eric	[id number]	Medicaid	[id number]  A	N	N	S	09/06/2022	09/06/2022	dextroamp-amphetamin 30 mg tab	30	30	GoldbergBulmaro	[id number]	Medicaid	[id number]  A	N	N	S	08/03/2022	08/05/2022	dextroamp-amphetamin 30 mg tab	30	30	Goldberg, Eric	[id number]	Medicaid	[id number]    * - Details of Drug Type : O = Opioid, B = Benzodiazepine, S = Stimulant

## 2023-07-27 NOTE — PROGRESS NOTE ADULT - SUBJECTIVE AND OBJECTIVE BOX
OBGYN PA NOTE POD2  Patient seen and evaluated at bedside,  resting comfortably w/o any acute  complaints.  Denies fever, HA, CP, SOB, N/V/D, dizziness, palpitations, worsening abdominal pain, worsening vaginal bleeding, or any other concerns.  Ambulating and voiding without difficulty.  Passing flatus and tolerating regular diet.  Attempting to breastfeed.     Vital Signs Last 24 Hrs  T(C): 36.8 (26 Jul 2023 05:36), Max: 36.8 (25 Jul 2023 10:00)  T(F): 98.3 (26 Jul 2023 05:36), Max: 98.3 (25 Jul 2023 18:11)  HR: 97 (26 Jul 2023 05:36) (80 - 97)  BP: 119/80 (26 Jul 2023 05:36) (98/65 - 119/80)  BP(mean): --  RR: 18 (26 Jul 2023 05:36) (17 - 18)  SpO2: 99% (26 Jul 2023 05:36) (97% - 99%)    Parameters below as of 26 Jul 2023 05:36  Patient On (Oxygen Delivery Method): room air        Physical Exam:     Gen: A&Ox 3, NAD  Chest: CTAB/L  Cardiac: S1+S2+ RRR  Breast: Soft, nontender, nonengorged  Abdomen: Soft, nontender, Fundus firm below umbilicus, +BS. incision clean, dry and intact  Gyn: Mild lochia,   Extremities: Nontender, DTRS 2+, no worsening edema                          7.5    10.43 )-----------( 257      ( 26 Jul 2023 06:10 )             24.8         
Patient seen at bedside resting comfortably offers no new complaints. + Ambulation, + void without difficulty, + flatus;  + bm;  tolerating regular diet. Both breastfeeding and bottle feeding. Denies HA, blurry vision or epigastric pain, CP, SOB, N/V/D,  dizziness, palpitations, worsening vaginal bleeding.     Vital Signs Last 24 Hrs  T(C): 36.7 (27 Jul 2023 06:06), Max: 36.7 (27 Jul 2023 06:06)  T(F): 98.1 (27 Jul 2023 06:06), Max: 98.1 (27 Jul 2023 06:06)  HR: 84 (27 Jul 2023 06:06) (83 - 84)  BP: 108/73 (27 Jul 2023 06:06) (100/64 - 108/73)  RR: 18 (27 Jul 2023 06:06) (17 - 18)  SpO2: 98% (27 Jul 2023 06:06) (97% - 98%)    Parameters below as of 27 Jul 2023 06:06  Patient On (Oxygen Delivery Method): room air        Gen: A&O x 3, NAD  Chest: CTA B/L  Cardiac: S1,S2  RRR  Breast: Soft, nontender, nonengorged  Abdomen: +BS; soft; Nontender, nondistended, Incision C/D/I steri strips in place   Gyn: Minimal lochia  Extremities: Nontender, DTRS 2+, no worsening edema                          7.5    7.76  )-----------( 269      ( 27 Jul 2023 06:26 )             25.2       A/P: POD #3 s/p c/s @39.3wks  -take iron, folic acid, vitamin C, and prenatal vitamins. eat iron fortified food  -d/c home   -instructions verbalized  -follow up in 1-2weeks in office for incision check    Patient seen and examined at bedside with Dr Contreras
OB PA Progress Note POD#1    Patient seen at bedside resting comfortably offers no new complaints. Calvillo recently removed, due to void, +flatus,  tolerating clear diet. both breast and bottle feeding. Denies HA, CP, SOB, N/V/D,  no bm; dizziness, palpitations, worsening abdominal pain, worsening vaginal bleeding, or any other concerns.     Vital Signs Last 24 Hrs  T(C): 36.9 (25 Jul 2023 05:32), Max: 36.9 (25 Jul 2023 05:32)  T(F): 98.4 (25 Jul 2023 05:32), Max: 98.4 (25 Jul 2023 05:32)  HR: 83 (25 Jul 2023 05:32) (64 - 88)  BP: 105/66 (25 Jul 2023 05:32) (95/63 - 115/83)  BP(mean): 82 (24 Jul 2023 15:00) (73 - 91)  RR: 17 (25 Jul 2023 05:32) (11 - 20)  SpO2: 97% (25 Jul 2023 05:32) (96% - 100%)    Parameters below as of 25 Jul 2023 05:32  Patient On (Oxygen Delivery Method): room air        Gen: A&O x 3, NAD  Chest: CTA B/L  Cardiac: S1,S2  RRR  Abdomen: +BS; soft; Nontender, nondistended; dressing removed incision C/D/I, steri strips in place  Gyn: minimal lochia   Extremities: Nontender, no worsening edema;                           8.1    11.57 )-----------( 240      ( 25 Jul 2023 06:15 )             27.0

## 2023-07-27 NOTE — PROGRESS NOTE ADULT - ASSESSMENT
A/P: POD #3 s/p c/s @39.3wks  -take iron, folic acid, vitamin C, and prenatal vitamins. eat iron fortified food  -d/c home   -instructions verbalized  -follow up in 1-2weeks in office for incision check    Patient seen and examined at bedside with Dr Contreras

## 2023-07-27 NOTE — CONSULT NOTE ADULT - SUBJECTIVE AND OBJECTIVE BOX
Source of information: JORDNY CANTU, Chart review  Patient language: English  : n/a    HPI:  Patient is a 33 year old female with no significant PMHx admitted for scheduled caesarean section on , now POD #3. The pain service was consulted for postoperative pain management. Patient seen and examined while laying in bed this morning, partner at bedside. Patient denies current abdominal incision pain and reports low back pain score as 10/10 SCALE USED: (1-10 VNRS). Pt describes low pain as intermittent and sharp radiating to bilateral hips. The pain is alleviated by pain medication and exacerbated by movement and palpation. Pt tolerating PO diet. Denies lethargy, chest pain, SOB, nausea, vomiting, constipation. Reports last BM yesterday, . Patient stated goal for pain control: to be able to take deep breaths, get out of bed to chair and ambulate with tolerable pain control. Pt denies taking medications for pain at home and reports back pain in new since .     PAST MEDICAL & SURGICAL HISTORY:  No pertinent past medical history    FAMILY HISTORY:    Social History:  [x] Denies ETOH use, illicit drug use and smoking    Allergies    No Known Allergies    MEDICATIONS  (STANDING):  acetaminophen     Tablet .. 1000 milliGRAM(s) Oral every 6 hours  diphtheria/tetanus/pertussis (acellular) Vaccine (Adacel) 0.5 milliLiter(s) IntraMuscular once  ferrous    sulfate 325 milliGRAM(s) Oral daily  folic acid 1 milliGRAM(s) Oral daily  heparin   Injectable 5000 Unit(s) SubCutaneous every 12 hours  lactated ringers. 1000 milliLiter(s) (125 mL/Hr) IV Continuous <Continuous>  lidocaine   4% Patch 1 Patch Transdermal daily  morphine PF Spinal 0.2 milliGRAM(s) IntraThecal. once  oxytocin Infusion 333.333 milliUNIT(s)/Min (1000 mL/Hr) IV Continuous <Continuous>  prenatal multivitamin 1 Tablet(s) Oral daily  senna 2 Tablet(s) Oral at bedtime    MEDICATIONS  (PRN):  dexAMETHasone  Injectable 4 milliGRAM(s) IV Push every 6 hours PRN Nausea  diphenhydrAMINE 25 milliGRAM(s) Oral every 6 hours PRN Pruritus  ibuprofen  Tablet. 600 milliGRAM(s) Oral every 6 hours PRN Moderate Pain (4 - 6)  lanolin Ointment 1 Application(s) Topical every 6 hours PRN Sore Nipples  magnesium hydroxide Suspension 30 milliLiter(s) Oral two times a day PRN Constipation  naloxone Injectable 0.1 milliGRAM(s) IV Push every 3 minutes PRN For ANY of the following changes in patient status:  A. Breaths Per Minute LESS THAN 10, B. Oxygen saturation LESS THAN 90%, C. Sedation score of 6 for Stop After: 4 Times  ondansetron Injectable 4 milliGRAM(s) IV Push every 6 hours PRN Nausea  oxyCODONE    IR 5 milliGRAM(s) Oral every 3 hours PRN Severe Pain (7 - 10)  simethicone 80 milliGRAM(s) Chew every 4 hours PRN Gas    Vital Signs Last 24 Hrs  T(C): 36.7 (2023 06:06), Max: 36.7 (2023 06:06)  T(F): 98.1 (2023 06:06), Max: 98.1 (2023 06:06)  HR: 84 (2023 06:06) (83 - 84)  BP: 108/73 (2023 06:06) (100/64 - 108/73)  BP(mean): --  RR: 18 (2023 06:06) (17 - 18)  SpO2: 98% (2023 06:06) (97% - 98%)    Parameters below as of 2023 06:06  Patient On (Oxygen Delivery Method): room air    LABS: Reviewed.                          7.5    7.76  )-----------( 269      ( 2023 06:26 )             25.2     CAPILLARY BLOOD GLUCOSE    Radiology: None to review.    ORT Score -   Family Hx of substance abuse	Female	      Male  Alcohol 	                                           1                     3  Illegal drugs	                                   2                     3  Rx drugs                                           4 	                  4  Personal Hx of substance abuse		  Alcohol 	                                          3	                  3  Illegal drugs                                     4	                  4  Rx drugs                                            5 	                  5  Age between 16- 45 years	           1                     1  hx preadolescent sexual abuse	   3 	                  0  Psychological disease		  ADD, OCD, bipolar, schizophrenia   2	          2  Depression                                           1 	          1  Total: 0    a score of 3 or lower indicates low risk for opioid abuse		  a score of 4-7 indicates moderate risk for opioid abuse		  a score of 8 or higher indicates high risk for opioid abuse  	  REVIEW OF SYSTEMS:  CONSTITUTIONAL: No fever or fatigue  HEENT:  No difficulty hearing, no change in vision  NECK: No pain or stiffness  RESPIRATORY: No cough, wheezing, chills or hemoptysis; No shortness of breath  CARDIOVASCULAR: No chest pain, palpitations, dizziness, or leg swelling  GASTROINTESTINAL: No loss of appetite, decreased PO intake. No abdominal or epigastric pain. No nausea, vomiting; No diarrhea or constipation.   GENITOURINARY: No dysuria, frequency, hematuria, retention or incontinence  MUSCULOSKELETAL: No joint pain or swelling; + low back pain with radiation to bilateral hips, no upper or lower motor strength weakness, no saddle anesthesia, bowel/bladder incontinence, no falls   NEURO: No headaches, No numbness/tingling b/l LE, No weakness    PHYSICAL EXAM:  GENERAL:  Alert & Oriented X4, cooperative, NAD, Good concentration. Speech is clear.   RESPIRATORY: Respirations even and unlabored. Clear to auscultation bilaterally; No rales, rhonchi, wheezing, or rubs  CARDIOVASCULAR: Normal S1/S2, regular rate and rhythm; No murmurs, rubs, or gallops. No JVD.   GASTROINTESTINAL:  Soft, Nontender, Nondistended; Bowel sounds present  PERIPHERAL VASCULAR:  Extremities warm without edema. 2+ Peripheral Pulses, No cyanosis, No calf tenderness  MUSCULOSKELETAL: Motor Strength 5/5 B/L upper and lower extremities; moves all extremities equally against gravity; ROM intact; negative SLR; No tenderness on palpation of all joints.   SKIN: Warm, dry, + Low abdominal incision with steri strips, minimal serosanguinous drainage.     Risk factors associated with adverse outcomes related to opioid treatment  [ ]  Concurrent benzodiazepine use  [ ]  History/ Active substance use or alcohol use disorder  [ ] Psychiatric co-morbidity  [ ] Sleep apnea  [ ] COPD  [ ] BMI> 35  [ ] Liver dysfunction  [ ] Renal dysfunction  [ ] CHF  [ ] Smoker  [ ]  Age > 60 years    [x]  [place]  Reviewed and Copied to Chart. See below.    Plan of care and goal oriented pain management treatment options were discussed with patient and /or primary care giver; all questions and concerns were addressed and care was aligned with patient's wishes.    Educated patient on goal oriented pain management treatment options

## 2023-07-27 NOTE — PROGRESS NOTE ADULT - PROBLEM SELECTOR PLAN 1
A/P: POD #1 s/p scheduled rpt c/s 39 weeks 3 days, stable    -Pain management, postop care  -heparin dvt prophylaxis  -OOB and ambulate, incentive spirometry  - f/u void  -Advance diet  -Encourage breastfeeding   -d/w Dr. Bullard
A/P: POD #3 s/p c/s @39.3wks  -take iron, folic acid, vitamin C, and prenatal vitamins. eat iron fortified food  -d/c home   -instructions verbalized  -follow up in 1-2weeks in office for incision check    Patient seen and examined at bedside with Dr Contreras

## 2023-07-27 NOTE — CONSULT NOTE ADULT - PROBLEM SELECTOR RECOMMENDATION 9
Pt with acute postoperative low back pain which radiates to bilateral hips which is somatic in nature due to s/p C- section on 7/24, now POD#3. Patient with acute blood loss anemia H/H (7.5/25.2) on 7/27. Patient to be discharged home today.   Opioid pain recommendations   - Oxycodone 5 mg PO q 3 hours PRN severe pain. Monitor for sedation/ respiratory depression.   Non-opioid pain recommendations   - Start Lidocaine 4% patch to low back  - Continue Ibuprofen 600mg PO q6 hours PRN for moderate pain.   - Start Acetaminophen 1000mg PO q 6 hours. Monitor LFTs  Bowel Regimen  - Continue Magnesium hydroxide 30ml BID PRN constipation  - Continue Senna 2 tablets at bedtime for constipation  Mild pain   - Non-pharmacological pain treatment recommendations  - Warm/ Cool packs PRN   - Repositioning, imagery, relaxation, distraction.  - OOB if no contraindications   Recommendations discussed with primary team and RN Pt with acute postoperative low back pain which radiates to bilateral hips which is somatic in nature due to s/p C- section on 7/24, now POD#3. Patient with acute blood loss anemia H/H (7.5/25.2) on 7/27. Patient to be discharged home today.   Opioid pain recommendations   - Oxycodone 5 mg PO q 3 hours PRN severe pain. Monitor for sedation/ respiratory depression.   Non-opioid pain recommendations   - Start Lidocaine 4% patch to low back  - Continue Ibuprofen 600mg PO q6 hours PRN for moderate pain.   - Start Acetaminophen 1000mg PO q 6 hours. Monitor LFTs  Bowel Regimen  - Continue Magnesium hydroxide 30ml BID PRN constipation  - Continue Senna 2 tablets at bedtime for constipation  Mild pain   - Non-pharmacological pain treatment recommendations  - Warm/ Cool packs PRN   - Repositioning, imagery, relaxation, distraction.  - OOB if no contraindications   Recommendations discussed with primary team and RN  Upon discharge recommend to continue pain regimen as above, sent oxycodone 5mg PO q6h PRN severe pain with bowel regimen x 5 days.

## 2023-07-28 RX ORDER — OXYCODONE HYDROCHLORIDE 5 MG/1
1 TABLET ORAL
Qty: 8 | Refills: 0
Start: 2023-07-28

## 2024-05-20 NOTE — PATIENT PROFILE OB - FLU SEASON?
Wound Procedure Treatment Venous Ulcer Left;Lower Leg    Performed by: Soledad Day RN  Authorized by: Farhad Méndez DO    Associated wounds:   Wound 05/06/24 Venous Ulcer Leg Left;Lower  Wound cleansed with:  Soap and water  Applied primary dressing:  Silver  Applied secondary dressing:  Superabsorber  Wound secured with:  Vanesa and Tape  Home care instructions:  Silver alginate       No